# Patient Record
(demographics unavailable — no encounter records)

---

## 2025-01-23 NOTE — HISTORY OF PRESENT ILLNESS
[FreeTextEntry1] : I have had the opportunity to see your patient, JAMIE MELO, in follow up.   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   Interval history:  His seizures typically occur with fever. Last documented seizure in the EHR is 12/2/2023. 3 minute convulsive event occurred in setting of fever due to parainfluenza virus. Mother report that he had seizures in Jan or Feb of 2024 while in [place]. She is appropriately concerned regarding possible seizure recurrence given winter months with higher risk of respiratory viral infections. She is administering valproate regularly twice daily. Dose is 250 mg bid - 16 mg/kg/day. Medication is causing adverse behavioral effects (irritability, temper outbursts, moodiness) and sedation (sometimes sleeps in class). By history is receiving adequate sleep at this time 9-10 hours per night. Recommendation for IEP was made at last visit. Mother has declined this evaluation due to her concerns about bias and stigmatization.   Paraclinical studies: - EEG done 5/2023- Left centrotemporal spikes.  - MRI 1/2022 - 1.Small foci of T2/FLAIR hyperintensity in the left deep cerebellar white matter, as well as in the right greater than left frontoparietal white matter. These are nonspecific findings, most likely representing small foci of nonspecific gliosis in light of the history of prematurity and PDA closure. If there is ongoing clinical concern for intracranial abnormality, follow up imaging to evaluate for stability may be considered, as clinically indicated. 2. Mild ill-defined T2/FLAIR hyperintensity in the bilateral periatrial white matter may represent terminal zones of myelination versus mild nonspecific gliosis. 3. Otherwise unremarkable noncontrast MRI examination of the brain.

## 2025-01-23 NOTE — ASSESSMENT
[FreeTextEntry1] : It was my pleasure to have seen JAMIE MELO in consultation.   Identification:  8 year male   Impression: Focal epilepsy   Medical decision making: Seizures typically provoked by fever but EEG does demonstrate an epileptic diathesis. Clinical presentation is suggestive for Febrile Seizures Plus (FS+) which is sometimes associated with SCN1A mutations but genetic testing has not been performed.    Discussion: Seizure diagnosis, prognosis, recurrence risk, provocative factors, health risks, first aid and safety precautions were reviewed.    Recommendations: - Laboratory studies. - Continued treatment with VPA given seizure recurrence risk. - IN diazepam as rescue medication. - Paraprofessional at school in order to: 1. Monitor for seizures while at highest risk during respiratory viral season. 2. Time the seizure 2. Activate appropriate staff to administer rescue medication (intranasal diazepam) promptly. 3. Monitor for and provide intervention for adverse effect of needed antiseizure medication therapy including inattention, moodiness and sedation (providing redirection, assistance with organization, task completion, transitions, simple behavioral management, safety) - Referrals for PT and OT

## 2025-01-23 NOTE — PHYSICAL EXAM
[Well-appearing] : well-appearing [Normocephalic] : normocephalic [No dysmorphic facial features] : no dysmorphic facial features [No ocular abnormalities] : no ocular abnormalities [Lungs clear] : lungs clear [Heart sounds regular in rate and rhythm] : heart sounds regular in rate and rhythm [No abnormal neurocutaneous stigmata or skin lesions] : no abnormal neurocutaneous stigmata or skin lesions [Straight] : straight [No deformities] : no deformities [Alert] : alert [Normal speech and language] : normal speech and language [Pupils reactive to light and accommodation] : pupils reactive to light and accommodation [Full extraocular movements] : full extraocular movements [No nystagmus] : no nystagmus [No facial asymmetry or weakness] : no facial asymmetry or weakness [Gross hearing intact] : gross hearing intact [Normal axial and appendicular muscle tone] : normal axial and appendicular muscle tone [No pronator drift] : no pronator drift [5/5 strength in proximal and distal muscles of arms and legs] : 5/5 strength in proximal and distal muscles of arms and legs [2+ biceps] : 2+ biceps [Triceps] : triceps [Knee jerks] : knee jerks [Ankle jerks] : ankle jerks [No ankle clonus] : no ankle clonus [Bilaterally] : bilaterally [Localizes LT and temperature] : localizes LT and temperature [de-identified] : He is very active [de-identified] : narrow based gait. Patient was able to balance independently on each lower extremity for several seconds [de-identified] : no dysmetria was noted when reaching. Well developed pincer grasp was present bilaterally

## 2025-01-23 NOTE — QUALITY MEASURES
[Seizure frequency] : Seizure frequency: Yes [Etiology, seizure type, and epilepsy syndrome] : Etiology, seizure type, and epilepsy syndrome: Yes [Side effects of anti-seizure medications] : Side effects of anti-seizure medications: Yes [Safety and education around seizures] : Safety and education around seizures: Yes [Sudden unexpected death in epilepsy (SUDEP)] : Sudden unexpected death in epilepsy: Yes [Screening for anxiety, depression] : Screening for anxiety, depression: Yes [Adherence to medication(s)] : Adherence to medication(s): Yes [25 Hydroxy Vitamin D level assessed and Vitamin D3 ordered] : 25 Hydroxy Vitamin D level assessed and Vitamin D3 ordered: Yes [Issues around driving] : Issues around driving: Not Applicable [Treatment-resistant epilepsy (every visit)] : Treatment-resistant epilepsy (every visit): Not Applicable [Counseling for women of childbearing potential with epilepsy (including folic acid supplement)] : Counseling for women of childbearing potential with epilepsy (including folic acid supplement): Not Applicable [Options for adjunctive therapy (Neurostimulation, CBD, Dietary Therapy, Epilepsy Surgery)] : Options for adjunctive therapy (Neurostimulation, CBD, Dietary Therapy, Epilepsy Surgery): Not Applicable [Thyroid profile ordered] : Thyroid profile ordered: Not Applicable

## 2025-04-03 NOTE — ASSESSMENT
[FreeTextEntry1] : Tom is a 8-year-old male s/p exploratory laparotomy, lysis of adhesions, small-bowel resection with anastomosis, and appendectomy on 2/17. On exam today, his incision looked clean, well healed, and dry. I have counseled his mother and emphasized to her that Tom is cleared to go back to school with only restrictions on sports and heavy lifting until March 31st. He can use tylenol or motrin on an as needed basis for pain and is cleared for PT and OT. Regarding his occasional constipation, Tom can use half a cap of miralax in water or prune juice to help his stool. He should follow up with me again in three months. His mother has demonstrated understanding and has agreed to the plan. She has my information and knows to contact me sooner with any questions or concerns.

## 2025-04-03 NOTE — ADDENDUM
[FreeTextEntry1] : Documented by Areli Edmonds acting as a scribe for Dr. Carrillo on 03/25/2025. All medical record entries made by the Scribe were at Dr. Carrillo's direction and personally dictated by me on 03/25/2025. I have reviewed the chart and agree that the record accurately reflects my personal performances of the history, physical exam, assessment, and plan. I have also personally directed, reviewed, and agree with the plan.

## 2025-04-03 NOTE — REASON FOR VISIT
[Mother] : mother [____ Month(s)] : [unfilled] month(s)  [Other: ____] : [unfilled] [Pain] : ~He/She~ does not have pain [Fever] : ~He/She~ does not have fever [Normal bowel movements] : ~He/She~ has normal bowel movements [Vomiting] : ~He/She~ does not have vomiting [Tolerating Diet] : ~He/She~ is tolerating diet [Redness at incision] : ~He/She~ does not have redness at incision [Drainage at incision] : ~He/She~ does not have drainage at incision [Swelling at surgical site] : ~He/She~ does not have swelling at surgical site [Normal range of motion] : ~He/She~ has normal range of motion [Yellowing of skin/eyes] : ~He/She~ does not have yellowing of skin/eyes [Patient] : patient [Medical Records] : medical records [de-identified] : 02/17/2025 [de-identified] : Janeen Carrillo MD [de-identified] : Tom is a 8-year-old male s/p exploratory laparotomy, lysis of adhesions, small-bowel resection with anastomosis, and appendectomy on 2/17/25. He was discharged home on 2/28. Overall, he is doing well and tolerating a regular diet. His mother states that he cannot walk for long periods of time. She states that he has been slightly constipated but has been passing stool every day to every other day. He has no unexplained fevers at this time.

## 2025-04-03 NOTE — REASON FOR VISIT
[Mother] : mother [____ Month(s)] : [unfilled] month(s)  [Other: ____] : [unfilled] [Pain] : ~He/She~ does not have pain [Fever] : ~He/She~ does not have fever [Normal bowel movements] : ~He/She~ has normal bowel movements [Vomiting] : ~He/She~ does not have vomiting [Tolerating Diet] : ~He/She~ is tolerating diet [Redness at incision] : ~He/She~ does not have redness at incision [Drainage at incision] : ~He/She~ does not have drainage at incision [Swelling at surgical site] : ~He/She~ does not have swelling at surgical site [Normal range of motion] : ~He/She~ has normal range of motion [Yellowing of skin/eyes] : ~He/She~ does not have yellowing of skin/eyes [Patient] : patient [Medical Records] : medical records [de-identified] : 02/17/2025 [de-identified] : Janeen Carrillo MD [de-identified] : Tom is a 8-year-old male s/p exploratory laparotomy, lysis of adhesions, small-bowel resection with anastomosis, and appendectomy on 2/17/25. He was discharged home on 2/28. Overall, he is doing well and tolerating a regular diet. His mother states that he cannot walk for long periods of time. She states that he has been slightly constipated but has been passing stool every day to every other day. He has no unexplained fevers at this time.

## 2025-04-03 NOTE — CONSULT LETTER
[Dear  ___] : Dear  [unfilled], [Consult Letter:] : I had the pleasure of evaluating your patient, [unfilled]. [Please see my note below.] : Please see my note below. [Consult Closing:] : Thank you very much for allowing me to participate in the care of this patient.  If you have any questions, please do not hesitate to contact me. [Sincerely,] : Sincerely, [FreeTextEntry2] : Pratima Wright MD [FreeTextEntry3] : Janeen Carrillo MD Division of Pediatric, General, and Thoracic and Endoscopic Surgery [facility]

## 2025-04-03 NOTE — CONSULT LETTER
[Dear  ___] : Dear  [unfilled], [Consult Letter:] : I had the pleasure of evaluating your patient, [unfilled]. [Please see my note below.] : Please see my note below. [Consult Closing:] : Thank you very much for allowing me to participate in the care of this patient.  If you have any questions, please do not hesitate to contact me. [Sincerely,] : Sincerely, [FreeTextEntry2] : Pratima Wright MD [FreeTextEntry3] : Janeen Carrillo MD Division of Pediatric, General, and Thoracic and Endoscopic Surgery [facility]

## 2025-07-03 NOTE — ASSESSMENT
[FreeTextEntry1] : Tom is a 9 year old boy who is s/p exploratory laparotomy, lysis of adhesions, small-bowel resection with anastomosis, and appendectomy on 2/17/25. He continues to do well without symptoms, and I offered my reassurance to mom. We reviewed postoperative expectations again and they understand that he can resume his usual activities without restrictions at this point. However, should he develop any recurrent symptoms, mom knows to follow up with me to reexamine or take him to the ER if his symptoms are severe (emesis, abdominal pain, and abdominal distention). I discussed the importance of having regular BMs. I discussed that mom can also give him miralax to soften the stools. All questions answered. Otherwise, we will follow up on an as-needed basis. She understands and agrees with this plan. She has my information and knows to contact me with any questions or concerns.

## 2025-07-03 NOTE — ADDENDUM
[FreeTextEntry1] : Documented by Marek Garcia acting as a scribe for Dr. Carrillo on 06/30/2025.   All medical record entries made by the Scribe were at my, Dr. Carrillo, direction and personally dictated by me on 06/30/2025. I have reviewed the chart and agree that the record accurately reflects my personal performances of the history, physical exam, assessment and plan. I have also personally directed, reviewed, and agree with the instructions.

## 2025-07-03 NOTE — CONSULT LETTER
[Dear  ___] : Dear  [unfilled], [Consult Letter:] : I had the pleasure of evaluating your patient, [unfilled]. [Please see my note below.] : Please see my note below. [Consult Closing:] : Thank you very much for allowing me to participate in the care of this patient.  If you have any questions, please do not hesitate to contact me. [Sincerely,] : Sincerely, [FreeTextEntry2] : Pratima Wright MD [FreeTextEntry3] : Janeen Carrillo MD Division of Pediatric, General, Thoracic and Endoscopic Surgery [facility]

## 2025-07-03 NOTE — REASON FOR VISIT
[Follow-up - Scheduled] : a follow-up, scheduled visit for [Patient] : patient [Mother] : mother no [FreeTextEntry4] : Dr. Pratima Wright [Medical Records] : medical records

## 2025-07-03 NOTE — HISTORY OF PRESENT ILLNESS
[FreeTextEntry1] : Tom is a 9 year old boy who is s/p exploratory laparotomy, lysis of adhesions, small-bowel resection with anastomosis, and appendectomy on 2/17/25. He has been doing well and denies any abdominal pain or discomfort. Mom notes he is stooling daily, but he occasionally has days where he has a small output of stool or the stool appears to be like judi, which is resolved with prunes. He is otherwise doing well. He is eating without emesis. No unexplained fevers or changes in energy levels.

## 2025-07-18 NOTE — ASSESSMENT
[FreeTextEntry1] : He has been seizure free on well tolerated medication. Seizure diagnosis, prognosis, recurrence risk, provocative factors, health risks, first aid and safety precautions were reviewed. Indications for continued pharmacological treatment of seizures, potential benefits and possible adverse effects of antiseizure medication(s) were carefully considered and discussed. An EEG will be obtained to screen for presence of interictal epileptiform abnormalities that would support an ongoing epileptic diathesis. Laboratory studies were requested.

## 2025-07-18 NOTE — HISTORY OF PRESENT ILLNESS
[FreeTextEntry1] : I have had the opportunity to see your patient, JAMIE MELO, in follow up.   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   Interval history:  His last seizure may have about 1 year ago. He is tolerating current dose of valproate at this time. His current dose is 250 mg bid which is 14 mg/kg/day. Mother reported that he did better in school. He still has difficulty initiating and maintaining sleep. Restless sleep is noted. Disruptive behaviors are denied. In the interval since his last visit he did undergo an exploratory laparotomy, lysis of adhesions, small bowel resection with anastomosis, and appendectomy.    Paraclinical studies: - EEG done 5/2023- Left centrotemporal spikes.  - MRI 1/2022 - 1.Small foci of T2/FLAIR hyperintensity in the left deep cerebellar white matter, as well as in the right greater than left frontoparietal white matter. These are nonspecific findings, most likely representing small foci of nonspecific gliosis in light of the history of prematurity and PDA closure. If there is ongoing clinical concern for intracranial abnormality, follow up imaging to evaluate for stability may be considered, as clinically indicated. 2. Mild ill-defined T2/FLAIR hyperintensity in the bilateral periatrial white matter may represent terminal zones of myelination versus mild nonspecific gliosis. 3. Otherwise unremarkable noncontrast MRI examination of the brain.

## 2025-07-18 NOTE — PHYSICAL EXAM
[Well-appearing] : well-appearing [Normocephalic] : normocephalic [No dysmorphic facial features] : no dysmorphic facial features [No ocular abnormalities] : no ocular abnormalities [Lungs clear] : lungs clear [Heart sounds regular in rate and rhythm] : heart sounds regular in rate and rhythm [No abnormal neurocutaneous stigmata or skin lesions] : no abnormal neurocutaneous stigmata or skin lesions [Straight] : straight [No deformities] : no deformities [Alert] : alert [Normal speech and language] : normal speech and language [Pupils reactive to light and accommodation] : pupils reactive to light and accommodation [Full extraocular movements] : full extraocular movements [No nystagmus] : no nystagmus [No facial asymmetry or weakness] : no facial asymmetry or weakness [Gross hearing intact] : gross hearing intact [Normal axial and appendicular muscle tone] : normal axial and appendicular muscle tone [No pronator drift] : no pronator drift [5/5 strength in proximal and distal muscles of arms and legs] : 5/5 strength in proximal and distal muscles of arms and legs [2+ biceps] : 2+ biceps [Triceps] : triceps [Knee jerks] : knee jerks [Ankle jerks] : ankle jerks [No ankle clonus] : no ankle clonus [Bilaterally] : bilaterally [de-identified] : He is very active [de-identified] : casual gait was narrow based, able to walk on heels and toes but prominent synkinetic movements with heel walking, mild difficulty with tandem gait [de-identified] : normal response to touch at all tested locations, Romberg negative  [de-identified] : finger to nose were intact, fast finger movements were somewhat slowed and dysrhythmic